# Patient Record
Sex: FEMALE | Race: WHITE
[De-identification: names, ages, dates, MRNs, and addresses within clinical notes are randomized per-mention and may not be internally consistent; named-entity substitution may affect disease eponyms.]

---

## 2017-06-27 ENCOUNTER — HOSPITAL ENCOUNTER (INPATIENT)
Dept: HOSPITAL 19 - LDR | Age: 32
LOS: 29 days | Discharge: HOME | End: 2017-07-26
Attending: OBSTETRICS & GYNECOLOGY | Admitting: OBSTETRICS & GYNECOLOGY
Payer: COMMERCIAL

## 2017-06-27 VITALS — HEIGHT: 69.02 IN | BODY MASS INDEX: 25.24 KG/M2 | WEIGHT: 170.42 LBS

## 2017-06-27 DIAGNOSIS — Z3A.38: ICD-10-CM

## 2017-07-25 VITALS — HEART RATE: 82 BPM | DIASTOLIC BLOOD PRESSURE: 65 MMHG | SYSTOLIC BLOOD PRESSURE: 116 MMHG

## 2017-07-25 VITALS — SYSTOLIC BLOOD PRESSURE: 102 MMHG | DIASTOLIC BLOOD PRESSURE: 65 MMHG | TEMPERATURE: 97.9 F | HEART RATE: 65 BPM

## 2017-07-25 VITALS — HEART RATE: 81 BPM | DIASTOLIC BLOOD PRESSURE: 62 MMHG | SYSTOLIC BLOOD PRESSURE: 103 MMHG

## 2017-07-25 VITALS — TEMPERATURE: 97.7 F | DIASTOLIC BLOOD PRESSURE: 61 MMHG | HEART RATE: 78 BPM | SYSTOLIC BLOOD PRESSURE: 109 MMHG

## 2017-07-25 VITALS — SYSTOLIC BLOOD PRESSURE: 108 MMHG | DIASTOLIC BLOOD PRESSURE: 63 MMHG | HEART RATE: 76 BPM

## 2017-07-25 VITALS — DIASTOLIC BLOOD PRESSURE: 66 MMHG | SYSTOLIC BLOOD PRESSURE: 103 MMHG | HEART RATE: 62 BPM

## 2017-07-25 VITALS — HEART RATE: 88 BPM | DIASTOLIC BLOOD PRESSURE: 55 MMHG | SYSTOLIC BLOOD PRESSURE: 101 MMHG

## 2017-07-25 VITALS — SYSTOLIC BLOOD PRESSURE: 117 MMHG | HEART RATE: 63 BPM | DIASTOLIC BLOOD PRESSURE: 68 MMHG

## 2017-07-25 VITALS — HEART RATE: 66 BPM | DIASTOLIC BLOOD PRESSURE: 64 MMHG | SYSTOLIC BLOOD PRESSURE: 104 MMHG

## 2017-07-25 VITALS — HEART RATE: 71 BPM | DIASTOLIC BLOOD PRESSURE: 58 MMHG | SYSTOLIC BLOOD PRESSURE: 122 MMHG

## 2017-07-25 VITALS — SYSTOLIC BLOOD PRESSURE: 113 MMHG | HEART RATE: 79 BPM | DIASTOLIC BLOOD PRESSURE: 58 MMHG

## 2017-07-25 VITALS — DIASTOLIC BLOOD PRESSURE: 68 MMHG | HEART RATE: 67 BPM | SYSTOLIC BLOOD PRESSURE: 122 MMHG

## 2017-07-25 VITALS — HEART RATE: 75 BPM | SYSTOLIC BLOOD PRESSURE: 109 MMHG | DIASTOLIC BLOOD PRESSURE: 55 MMHG

## 2017-07-25 VITALS — DIASTOLIC BLOOD PRESSURE: 80 MMHG | SYSTOLIC BLOOD PRESSURE: 121 MMHG | HEART RATE: 93 BPM

## 2017-07-25 VITALS — DIASTOLIC BLOOD PRESSURE: 77 MMHG | SYSTOLIC BLOOD PRESSURE: 120 MMHG | HEART RATE: 72 BPM | TEMPERATURE: 98.2 F

## 2017-07-25 VITALS — DIASTOLIC BLOOD PRESSURE: 80 MMHG | HEART RATE: 77 BPM | TEMPERATURE: 98.4 F | SYSTOLIC BLOOD PRESSURE: 122 MMHG

## 2017-07-25 VITALS — SYSTOLIC BLOOD PRESSURE: 102 MMHG | DIASTOLIC BLOOD PRESSURE: 61 MMHG | HEART RATE: 78 BPM

## 2017-07-25 VITALS — SYSTOLIC BLOOD PRESSURE: 97 MMHG | HEART RATE: 73 BPM | DIASTOLIC BLOOD PRESSURE: 59 MMHG

## 2017-07-25 VITALS — DIASTOLIC BLOOD PRESSURE: 70 MMHG | SYSTOLIC BLOOD PRESSURE: 104 MMHG | HEART RATE: 66 BPM

## 2017-07-25 VITALS — DIASTOLIC BLOOD PRESSURE: 58 MMHG | HEART RATE: 103 BPM | SYSTOLIC BLOOD PRESSURE: 111 MMHG

## 2017-07-25 VITALS — HEART RATE: 76 BPM | DIASTOLIC BLOOD PRESSURE: 62 MMHG | SYSTOLIC BLOOD PRESSURE: 110 MMHG

## 2017-07-25 LAB
BASOPHILS # BLD: 0 10*3/UL (ref 0–0.2)
BASOPHILS NFR BLD AUTO: 0.5 % (ref 0–2)
EOSINOPHIL # BLD: 0.1 10*3/UL (ref 0–0.7)
EOSINOPHIL NFR BLD: 1.3 % (ref 0–4)
ERYTHROCYTE [DISTWIDTH] IN BLOOD BY AUTOMATED COUNT: 14 % (ref 11.5–14.5)
GRANULOCYTES # BLD AUTO: 68.2 % (ref 42.2–75.2)
HCT VFR BLD AUTO: 34.8 % (ref 37–47)
HGB BLD-MCNC: 11.6 G/DL (ref 12.5–16)
LYMPHOCYTES # BLD: 1.8 10*3/UL (ref 1.2–3.4)
LYMPHOCYTES NFR BLD: 21.1 % (ref 20–51)
MCH RBC QN AUTO: 29 PG (ref 27–31)
MCHC RBC AUTO-ENTMCNC: 33 G/DL (ref 33–37)
MCV RBC AUTO: 87 FL (ref 80–100)
MONOCYTES # BLD: 0.6 10*3/UL (ref 0.1–0.6)
MONOCYTES NFR BLD AUTO: 7.2 % (ref 1.7–9.3)
NEUTROPHILS # BLD: 5.8 10*3/UL (ref 1.4–6.5)
PLATELET # BLD AUTO: 153 K/MM3 (ref 130–400)
PMV BLD AUTO: 10.6 FL (ref 7.4–10.4)
RBC # BLD AUTO: 3.99 M/MM3 (ref 4.1–5.3)
WBC # BLD AUTO: 8.5 K/MM3 (ref 4.8–10.8)

## 2017-07-26 VITALS — SYSTOLIC BLOOD PRESSURE: 110 MMHG | HEART RATE: 77 BPM | DIASTOLIC BLOOD PRESSURE: 61 MMHG | TEMPERATURE: 97.8 F

## 2020-07-27 ENCOUNTER — HOSPITAL ENCOUNTER (INPATIENT)
Dept: HOSPITAL 19 - LDRO | Age: 35
LOS: 3 days | Discharge: HOME | End: 2020-07-30
Attending: OBSTETRICS & GYNECOLOGY | Admitting: OBSTETRICS & GYNECOLOGY
Payer: COMMERCIAL

## 2020-07-27 VITALS — SYSTOLIC BLOOD PRESSURE: 123 MMHG | DIASTOLIC BLOOD PRESSURE: 71 MMHG | HEART RATE: 81 BPM

## 2020-07-27 VITALS — HEART RATE: 82 BPM | DIASTOLIC BLOOD PRESSURE: 76 MMHG | SYSTOLIC BLOOD PRESSURE: 129 MMHG

## 2020-07-27 VITALS — SYSTOLIC BLOOD PRESSURE: 113 MMHG | HEART RATE: 72 BPM | DIASTOLIC BLOOD PRESSURE: 58 MMHG

## 2020-07-27 VITALS — SYSTOLIC BLOOD PRESSURE: 116 MMHG | DIASTOLIC BLOOD PRESSURE: 57 MMHG | HEART RATE: 72 BPM

## 2020-07-27 VITALS — HEART RATE: 71 BPM | DIASTOLIC BLOOD PRESSURE: 66 MMHG | SYSTOLIC BLOOD PRESSURE: 115 MMHG

## 2020-07-27 VITALS — HEART RATE: 71 BPM | TEMPERATURE: 97.8 F | SYSTOLIC BLOOD PRESSURE: 122 MMHG | DIASTOLIC BLOOD PRESSURE: 74 MMHG

## 2020-07-27 VITALS — DIASTOLIC BLOOD PRESSURE: 68 MMHG | SYSTOLIC BLOOD PRESSURE: 128 MMHG | HEART RATE: 98 BPM

## 2020-07-27 VITALS — HEART RATE: 73 BPM | DIASTOLIC BLOOD PRESSURE: 74 MMHG | SYSTOLIC BLOOD PRESSURE: 111 MMHG

## 2020-07-27 VITALS — SYSTOLIC BLOOD PRESSURE: 105 MMHG | HEART RATE: 66 BPM | DIASTOLIC BLOOD PRESSURE: 71 MMHG

## 2020-07-27 VITALS — SYSTOLIC BLOOD PRESSURE: 118 MMHG | HEART RATE: 72 BPM | DIASTOLIC BLOOD PRESSURE: 64 MMHG

## 2020-07-27 VITALS — SYSTOLIC BLOOD PRESSURE: 94 MMHG | DIASTOLIC BLOOD PRESSURE: 54 MMHG | HEART RATE: 68 BPM

## 2020-07-27 VITALS — SYSTOLIC BLOOD PRESSURE: 119 MMHG | DIASTOLIC BLOOD PRESSURE: 60 MMHG | HEART RATE: 67 BPM

## 2020-07-27 VITALS — HEART RATE: 79 BPM | TEMPERATURE: 98.2 F | DIASTOLIC BLOOD PRESSURE: 75 MMHG | SYSTOLIC BLOOD PRESSURE: 120 MMHG

## 2020-07-27 VITALS — SYSTOLIC BLOOD PRESSURE: 98 MMHG | DIASTOLIC BLOOD PRESSURE: 58 MMHG | HEART RATE: 71 BPM | TEMPERATURE: 98 F

## 2020-07-27 VITALS — HEART RATE: 74 BPM | DIASTOLIC BLOOD PRESSURE: 58 MMHG | SYSTOLIC BLOOD PRESSURE: 123 MMHG

## 2020-07-27 VITALS — DIASTOLIC BLOOD PRESSURE: 70 MMHG | HEART RATE: 74 BPM | SYSTOLIC BLOOD PRESSURE: 106 MMHG

## 2020-07-27 VITALS — HEART RATE: 74 BPM | SYSTOLIC BLOOD PRESSURE: 115 MMHG | DIASTOLIC BLOOD PRESSURE: 75 MMHG

## 2020-07-27 VITALS — HEART RATE: 72 BPM | SYSTOLIC BLOOD PRESSURE: 128 MMHG | DIASTOLIC BLOOD PRESSURE: 66 MMHG

## 2020-07-27 VITALS — HEART RATE: 66 BPM | DIASTOLIC BLOOD PRESSURE: 73 MMHG | SYSTOLIC BLOOD PRESSURE: 109 MMHG

## 2020-07-27 VITALS — SYSTOLIC BLOOD PRESSURE: 113 MMHG | DIASTOLIC BLOOD PRESSURE: 55 MMHG | HEART RATE: 72 BPM

## 2020-07-27 VITALS — HEIGHT: 55 IN | WEIGHT: 178.35 LBS

## 2020-07-27 VITALS — DIASTOLIC BLOOD PRESSURE: 65 MMHG | HEART RATE: 81 BPM | TEMPERATURE: 98.1 F | SYSTOLIC BLOOD PRESSURE: 136 MMHG

## 2020-07-27 VITALS — DIASTOLIC BLOOD PRESSURE: 78 MMHG | HEART RATE: 102 BPM | SYSTOLIC BLOOD PRESSURE: 143 MMHG

## 2020-07-27 VITALS — SYSTOLIC BLOOD PRESSURE: 118 MMHG | DIASTOLIC BLOOD PRESSURE: 67 MMHG | HEART RATE: 72 BPM

## 2020-07-27 VITALS — SYSTOLIC BLOOD PRESSURE: 111 MMHG | DIASTOLIC BLOOD PRESSURE: 74 MMHG | HEART RATE: 68 BPM

## 2020-07-27 VITALS — HEART RATE: 72 BPM | DIASTOLIC BLOOD PRESSURE: 71 MMHG | SYSTOLIC BLOOD PRESSURE: 119 MMHG

## 2020-07-27 VITALS — DIASTOLIC BLOOD PRESSURE: 56 MMHG | SYSTOLIC BLOOD PRESSURE: 109 MMHG | HEART RATE: 73 BPM

## 2020-07-27 DIAGNOSIS — F32.9: ICD-10-CM

## 2020-07-27 DIAGNOSIS — F41.9: ICD-10-CM

## 2020-07-27 DIAGNOSIS — Z3A.36: ICD-10-CM

## 2020-07-27 LAB
BASOPHILS # BLD: 0 10*3/UL (ref 0–0.2)
BASOPHILS NFR BLD AUTO: 0.5 % (ref 0–2)
EOSINOPHIL # BLD: 0.1 10*3/UL (ref 0–0.7)
EOSINOPHIL NFR BLD: 0.8 % (ref 0–4)
ERYTHROCYTE [DISTWIDTH] IN BLOOD BY AUTOMATED COUNT: 13.7 % (ref 11.5–14.5)
GRANULOCYTES # BLD AUTO: 69.2 % (ref 42.2–75.2)
HCT VFR BLD AUTO: 33.5 % (ref 37–47)
HGB BLD-MCNC: 10.8 G/DL (ref 12.5–16)
LYMPHOCYTES # BLD: 1.7 10*3/UL (ref 1.2–3.4)
LYMPHOCYTES NFR BLD: 21.8 % (ref 20–51)
MCH RBC QN AUTO: 28 PG (ref 27–31)
MCHC RBC AUTO-ENTMCNC: 32 G/DL (ref 33–37)
MCV RBC AUTO: 87 FL (ref 80–100)
MONOCYTES # BLD: 0.5 10*3/UL (ref 0.1–0.6)
MONOCYTES NFR BLD AUTO: 6.7 % (ref 1.7–9.3)
NEUTROPHILS # BLD: 5.5 10*3/UL (ref 1.4–6.5)
PLATELET # BLD AUTO: 168 K/MM3 (ref 130–400)
PMV BLD AUTO: 11.2 FL (ref 7.4–10.4)
RBC # BLD AUTO: 3.87 M/MM3 (ref 4.1–5.3)

## 2020-07-27 PROCEDURE — 10907ZC DRAINAGE OF AMNIOTIC FLUID, THERAPEUTIC FROM PRODUCTS OF CONCEPTION, VIA NATURAL OR ARTIFICIAL OPENING: ICD-10-PCS | Performed by: OBSTETRICS & GYNECOLOGY

## 2020-07-27 NOTE — NUR
RN at bedside discussing plan of care and starting pitocin with patient. Pt
wanting to wait another hour before starting pitocin. Category 1 FHR tracing.
Contractions every 2-3 minutes.

## 2020-07-27 NOTE — NUR
SVE: 7/80/-2 with bulgy bag of mancia and bloody show noted.
1417:Dr Persaud called, see physician notification.
Pt requesting epidural, Matteo ROLLINS notified.
IV started to left Forearm x 1 attempt. Blood drawn then LR bolus started. Pen
G started per GBS + protocol.
1750:Matteo ROLLINS here and at bedside. Pt respositioned to sitting up.
Epidural placed, test dose at 1803. Pt tolerated well. See anesthesia notes.

## 2020-07-27 NOTE — NUR
Pt here with c/o increased pelvic pressure. Pt to room LDR 4. 36.4 weeks
gestation and G5L3. Pt states "I over did it this weekend I feel like I am
more dilated then 4." Pt also states feeling leaking at times this weekend
too.
Pt to [facility], explained. SVE: amniotrace negative. SVE: 5+/80/-3 and bag of
mancia palpated. Pt states she is only feeling contractions every 20-30
minutes. Assessment complete. GBS +.
1551:Dr Mccormick called and notified. Orders to recheck in 1 hour and if no
cervical change may DC home.

## 2020-07-28 VITALS — SYSTOLIC BLOOD PRESSURE: 99 MMHG | HEART RATE: 60 BPM | DIASTOLIC BLOOD PRESSURE: 57 MMHG

## 2020-07-28 VITALS — DIASTOLIC BLOOD PRESSURE: 68 MMHG | SYSTOLIC BLOOD PRESSURE: 125 MMHG | TEMPERATURE: 98 F | HEART RATE: 70 BPM

## 2020-07-28 VITALS — DIASTOLIC BLOOD PRESSURE: 51 MMHG | HEART RATE: 55 BPM | SYSTOLIC BLOOD PRESSURE: 94 MMHG

## 2020-07-28 VITALS — HEART RATE: 59 BPM | DIASTOLIC BLOOD PRESSURE: 69 MMHG | SYSTOLIC BLOOD PRESSURE: 113 MMHG | TEMPERATURE: 98 F

## 2020-07-28 VITALS — DIASTOLIC BLOOD PRESSURE: 68 MMHG | SYSTOLIC BLOOD PRESSURE: 115 MMHG | HEART RATE: 75 BPM

## 2020-07-28 VITALS — SYSTOLIC BLOOD PRESSURE: 125 MMHG | HEART RATE: 73 BPM | DIASTOLIC BLOOD PRESSURE: 78 MMHG

## 2020-07-28 VITALS — SYSTOLIC BLOOD PRESSURE: 118 MMHG | DIASTOLIC BLOOD PRESSURE: 74 MMHG | HEART RATE: 74 BPM

## 2020-07-28 VITALS — SYSTOLIC BLOOD PRESSURE: 111 MMHG | DIASTOLIC BLOOD PRESSURE: 58 MMHG | HEART RATE: 69 BPM

## 2020-07-28 VITALS — DIASTOLIC BLOOD PRESSURE: 52 MMHG | HEART RATE: 71 BPM | SYSTOLIC BLOOD PRESSURE: 103 MMHG

## 2020-07-28 VITALS — HEART RATE: 72 BPM | DIASTOLIC BLOOD PRESSURE: 72 MMHG | SYSTOLIC BLOOD PRESSURE: 116 MMHG

## 2020-07-28 VITALS — DIASTOLIC BLOOD PRESSURE: 71 MMHG | HEART RATE: 76 BPM | SYSTOLIC BLOOD PRESSURE: 115 MMHG

## 2020-07-28 VITALS — SYSTOLIC BLOOD PRESSURE: 133 MMHG | HEART RATE: 92 BPM | DIASTOLIC BLOOD PRESSURE: 86 MMHG

## 2020-07-28 VITALS — TEMPERATURE: 98.1 F | SYSTOLIC BLOOD PRESSURE: 119 MMHG | HEART RATE: 74 BPM | DIASTOLIC BLOOD PRESSURE: 76 MMHG

## 2020-07-28 VITALS — SYSTOLIC BLOOD PRESSURE: 98 MMHG | DIASTOLIC BLOOD PRESSURE: 56 MMHG | HEART RATE: 69 BPM

## 2020-07-28 VITALS — SYSTOLIC BLOOD PRESSURE: 97 MMHG | HEART RATE: 73 BPM | DIASTOLIC BLOOD PRESSURE: 56 MMHG

## 2020-07-28 VITALS — SYSTOLIC BLOOD PRESSURE: 129 MMHG | HEART RATE: 87 BPM | DIASTOLIC BLOOD PRESSURE: 75 MMHG

## 2020-07-28 VITALS — TEMPERATURE: 98 F | DIASTOLIC BLOOD PRESSURE: 67 MMHG | HEART RATE: 71 BPM | SYSTOLIC BLOOD PRESSURE: 115 MMHG

## 2020-07-28 VITALS — HEART RATE: 71 BPM | DIASTOLIC BLOOD PRESSURE: 66 MMHG | SYSTOLIC BLOOD PRESSURE: 104 MMHG

## 2020-07-28 VITALS — HEART RATE: 67 BPM | DIASTOLIC BLOOD PRESSURE: 58 MMHG | SYSTOLIC BLOOD PRESSURE: 99 MMHG

## 2020-07-28 VITALS — DIASTOLIC BLOOD PRESSURE: 77 MMHG | SYSTOLIC BLOOD PRESSURE: 134 MMHG | HEART RATE: 88 BPM

## 2020-07-28 VITALS — SYSTOLIC BLOOD PRESSURE: 110 MMHG | DIASTOLIC BLOOD PRESSURE: 56 MMHG

## 2020-07-28 VITALS — HEART RATE: 67 BPM | DIASTOLIC BLOOD PRESSURE: 51 MMHG | SYSTOLIC BLOOD PRESSURE: 105 MMHG | TEMPERATURE: 97.8 F

## 2020-07-28 VITALS — DIASTOLIC BLOOD PRESSURE: 65 MMHG | HEART RATE: 78 BPM | SYSTOLIC BLOOD PRESSURE: 97 MMHG

## 2020-07-28 VITALS — DIASTOLIC BLOOD PRESSURE: 53 MMHG | HEART RATE: 68 BPM | SYSTOLIC BLOOD PRESSURE: 88 MMHG

## 2020-07-28 VITALS — SYSTOLIC BLOOD PRESSURE: 97 MMHG | HEART RATE: 78 BPM | DIASTOLIC BLOOD PRESSURE: 56 MMHG

## 2020-07-28 VITALS — DIASTOLIC BLOOD PRESSURE: 59 MMHG | HEART RATE: 64 BPM | SYSTOLIC BLOOD PRESSURE: 103 MMHG

## 2020-07-28 VITALS — HEART RATE: 73 BPM | SYSTOLIC BLOOD PRESSURE: 122 MMHG | DIASTOLIC BLOOD PRESSURE: 70 MMHG

## 2020-07-28 VITALS — DIASTOLIC BLOOD PRESSURE: 55 MMHG | SYSTOLIC BLOOD PRESSURE: 98 MMHG | HEART RATE: 73 BPM

## 2020-07-28 VITALS — HEART RATE: 72 BPM | SYSTOLIC BLOOD PRESSURE: 99 MMHG | DIASTOLIC BLOOD PRESSURE: 62 MMHG

## 2020-07-28 VITALS — HEART RATE: 70 BPM | SYSTOLIC BLOOD PRESSURE: 96 MMHG | DIASTOLIC BLOOD PRESSURE: 54 MMHG

## 2020-07-28 VITALS — DIASTOLIC BLOOD PRESSURE: 56 MMHG | SYSTOLIC BLOOD PRESSURE: 101 MMHG | HEART RATE: 70 BPM

## 2020-07-28 VITALS — SYSTOLIC BLOOD PRESSURE: 121 MMHG | DIASTOLIC BLOOD PRESSURE: 66 MMHG | HEART RATE: 71 BPM

## 2020-07-28 VITALS — SYSTOLIC BLOOD PRESSURE: 100 MMHG | HEART RATE: 82 BPM | DIASTOLIC BLOOD PRESSURE: 56 MMHG

## 2020-07-28 VITALS — DIASTOLIC BLOOD PRESSURE: 52 MMHG | HEART RATE: 70 BPM | SYSTOLIC BLOOD PRESSURE: 98 MMHG

## 2020-07-28 VITALS — HEART RATE: 71 BPM | SYSTOLIC BLOOD PRESSURE: 101 MMHG | DIASTOLIC BLOOD PRESSURE: 63 MMHG

## 2020-07-28 VITALS — DIASTOLIC BLOOD PRESSURE: 59 MMHG | HEART RATE: 96 BPM | SYSTOLIC BLOOD PRESSURE: 100 MMHG

## 2020-07-28 VITALS — DIASTOLIC BLOOD PRESSURE: 67 MMHG | HEART RATE: 80 BPM | SYSTOLIC BLOOD PRESSURE: 104 MMHG

## 2020-07-28 NOTE — NUR
0530 - Room set up for delivery. Adair catheter removed, 300 mL out.  Pt
positioned into footplates, educated on pushing techniques. Dr. Persaud gowned
and gloved at perineum.
0531 - Initial push. Pushing well with contractions. Nursery nurse Amanda Cummings
at bedside.
0539 - Spontaneous vaginal delivery of viable infant girl. Infant placed to
mothers abdomen. Cord clamped x 2 by Dr. Persaud and cut by FOB. Care of infant
assumed to ADONAY Perez. Cord blood obtained.
0543 - Spontaneous delivery of intact placenta. Pitocin started at 333 mL/hr
per protocol. Fundal massage by Dr. Persaud, firm and at umbilicus. Perineum
intact per Dr. Persaud. Pericare done. New chux and ice pack beneath patient.
Postpartum recovery started.
See physician delivery note.

## 2020-07-28 NOTE — NUR
Patient ambulatory to bathroom with RN assist x2. Voids without difficulty.
Pericare performed, underwear, peripad, and ice pack in place. Gown changed.
Patient transferred to Room 208 via wheelchair. Oriented to room and plan of
care. Denies pain. Call light within reach.

## 2020-07-29 VITALS — DIASTOLIC BLOOD PRESSURE: 66 MMHG | SYSTOLIC BLOOD PRESSURE: 114 MMHG | TEMPERATURE: 98.6 F | HEART RATE: 63 BPM

## 2020-07-29 VITALS — HEART RATE: 57 BPM | DIASTOLIC BLOOD PRESSURE: 57 MMHG | SYSTOLIC BLOOD PRESSURE: 99 MMHG | TEMPERATURE: 97.5 F

## 2020-07-29 VITALS — HEART RATE: 61 BPM | SYSTOLIC BLOOD PRESSURE: 116 MMHG | DIASTOLIC BLOOD PRESSURE: 61 MMHG | TEMPERATURE: 97.6 F

## 2020-07-29 VITALS — HEART RATE: 65 BPM | SYSTOLIC BLOOD PRESSURE: 109 MMHG | DIASTOLIC BLOOD PRESSURE: 63 MMHG | TEMPERATURE: 97.9 F

## 2020-07-29 NOTE — NUR
Initial visit attempt; Family resting,  left card of congratulations
for the birth of their daughter and information regarding the availability of
spiritual care at [facility].

## 2020-07-30 VITALS — TEMPERATURE: 97.7 F | SYSTOLIC BLOOD PRESSURE: 106 MMHG | DIASTOLIC BLOOD PRESSURE: 64 MMHG | HEART RATE: 53 BPM
